# Patient Record
Sex: FEMALE | Race: WHITE | NOT HISPANIC OR LATINO | URBAN - METROPOLITAN AREA
[De-identification: names, ages, dates, MRNs, and addresses within clinical notes are randomized per-mention and may not be internally consistent; named-entity substitution may affect disease eponyms.]

---

## 2022-07-15 ENCOUNTER — WEB ENCOUNTER (OUTPATIENT)
Dept: URBAN - METROPOLITAN AREA CLINIC 105 | Facility: CLINIC | Age: 27
End: 2022-07-15

## 2022-07-15 ENCOUNTER — OFFICE VISIT (OUTPATIENT)
Dept: URBAN - METROPOLITAN AREA CLINIC 105 | Facility: CLINIC | Age: 27
End: 2022-07-15
Payer: COMMERCIAL

## 2022-07-15 ENCOUNTER — LAB OUTSIDE AN ENCOUNTER (OUTPATIENT)
Dept: URBAN - METROPOLITAN AREA CLINIC 105 | Facility: CLINIC | Age: 27
End: 2022-07-15

## 2022-07-15 VITALS
TEMPERATURE: 97.9 F | BODY MASS INDEX: 21.62 KG/M2 | DIASTOLIC BLOOD PRESSURE: 77 MMHG | HEART RATE: 99 BPM | SYSTOLIC BLOOD PRESSURE: 119 MMHG | HEIGHT: 69 IN | WEIGHT: 146 LBS

## 2022-07-15 DIAGNOSIS — R93.5 ABNORMAL CT OF THE ABDOMEN: ICD-10-CM

## 2022-07-15 DIAGNOSIS — Z80.0 FAMILY HISTORY OF COLON CANCER IN MOTHER: ICD-10-CM

## 2022-07-15 DIAGNOSIS — R10.32 LLQ PAIN: ICD-10-CM

## 2022-07-15 DIAGNOSIS — R14.0 BLOATING: ICD-10-CM

## 2022-07-15 PROCEDURE — 99243 OFF/OP CNSLTJ NEW/EST LOW 30: CPT | Performed by: INTERNAL MEDICINE

## 2022-07-15 PROCEDURE — 99203 OFFICE O/P NEW LOW 30 MIN: CPT | Performed by: INTERNAL MEDICINE

## 2022-07-15 RX ORDER — CETIRIZINE HCL 1 MG/ML
1 TABLET SOLUTION, ORAL ORAL ONCE A DAY
Qty: 30 | Status: ACTIVE | COMMUNITY
Start: 2022-07-15 | End: 2022-08-14

## 2022-07-15 RX ORDER — NORETHINDRONE ACETATE AND ETHINYL ESTRADIOL AND FERROUS FUMARATE 1MG-20(21)
TAKE 1 TABLET BY MOUTH EVERY DAY FOR 84 DAYS KIT ORAL
Qty: 84 EACH | Refills: 0 | Status: ACTIVE | COMMUNITY

## 2022-07-15 RX ORDER — DICYCLOMINE HYDROCHLORIDE 20 MG/1
1 TABLET TABLET ORAL
Qty: 90 | Refills: 0 | OUTPATIENT
Start: 2022-07-15 | End: 2022-08-14

## 2022-07-15 NOTE — HPI-TODAY'S VISIT:
7/15/22  26 yo lady pt of Dr Diana Brown. Referred for abdominal pain. A copy of this note will be sent to her.  She was seeing Dr Brown for urological evaluation of abdominal pain. She ordered CT Abd pelvis wo and w contrast done 3/2022 this showed questionable TI thickening which could be  underdistension Her mother passed away from colon CA at age 45.  She has seen GI in the past. Her last colonoscopy (2nd) was  7/18/2020 in [place]. it was normal.  Her GI evaluation for abd pain also included an EGD and was noted no GI causes for abd pain.  Urology evaluation was negative.  She has seen GYN and told it is possible she has endometriosis. Pain is mostly LLQ. really tight feeling. usually in am on awakening. She also has upper abodminal bloating. this is usually post prandial. LLQ pain is a few times a week. She has regular BMs. daily. Does not feel constipated.  Uses nsaids during her periods.

## 2022-08-18 ENCOUNTER — OFFICE VISIT (OUTPATIENT)
Dept: URBAN - METROPOLITAN AREA CLINIC 105 | Facility: CLINIC | Age: 27
End: 2022-08-18

## 2022-09-16 ENCOUNTER — OFFICE VISIT (OUTPATIENT)
Dept: URBAN - METROPOLITAN AREA CLINIC 105 | Facility: CLINIC | Age: 27
End: 2022-09-16

## 2022-10-13 ENCOUNTER — OFFICE VISIT (OUTPATIENT)
Dept: URBAN - METROPOLITAN AREA CLINIC 105 | Facility: CLINIC | Age: 27
End: 2022-10-13
Payer: COMMERCIAL

## 2022-10-13 VITALS
HEIGHT: 69 IN | BODY MASS INDEX: 22.07 KG/M2 | HEART RATE: 76 BPM | WEIGHT: 149 LBS | DIASTOLIC BLOOD PRESSURE: 74 MMHG | SYSTOLIC BLOOD PRESSURE: 113 MMHG | TEMPERATURE: 97.3 F

## 2022-10-13 DIAGNOSIS — R93.5 ABNORMAL CT OF THE ABDOMEN: ICD-10-CM

## 2022-10-13 DIAGNOSIS — R10.32 LLQ PAIN: ICD-10-CM

## 2022-10-13 DIAGNOSIS — K58.9 SYMPTOMS CONSISTENT WITH IRRITABLE BOWEL SYNDROME: ICD-10-CM

## 2022-10-13 DIAGNOSIS — Z80.0 FAMILY HISTORY OF COLON CANCER IN MOTHER: ICD-10-CM

## 2022-10-13 DIAGNOSIS — Z86.010 PERSONAL HISTORY OF COLONIC POLYPS: ICD-10-CM

## 2022-10-13 DIAGNOSIS — R14.0 BLOATING: ICD-10-CM

## 2022-10-13 PROBLEM — 10743008: Status: ACTIVE | Noted: 2022-10-13

## 2022-10-13 PROBLEM — 428283002: Status: ACTIVE | Noted: 2022-10-13

## 2022-10-13 PROCEDURE — 99213 OFFICE O/P EST LOW 20 MIN: CPT | Performed by: INTERNAL MEDICINE

## 2022-10-13 RX ORDER — NORETHINDRONE ACETATE AND ETHINYL ESTRADIOL AND FERROUS FUMARATE 1MG-20(21)
TAKE 1 TABLET BY MOUTH EVERY DAY FOR 84 DAYS KIT ORAL
Qty: 84 EACH | Refills: 0 | Status: ACTIVE | COMMUNITY

## 2022-10-13 NOTE — HPI-TODAY'S VISIT:
7/15/22  26 yo lady pt of Dr Diana Brown. Referred for abdominal pain. A copy of this note will be sent to her.  She was seeing Dr Brown for urological evaluation of abdominal pain. She ordered CT Abd pelvis wo and w contrast done 3/2022 this showed questionable TI thickening which could be  underdistension Her mother passed away from colon CA at age 45.  She has seen GI in the past. Her last colonoscopy (2nd) was  7/18/2020 in [place]. it was normal.  Her GI evaluation for abd pain also included an EGD and was noted no GI causes for abd pain.  Urology evaluation was negative.  She has seen GYN and told it is possible she has endometriosis. Pain is mostly LLQ. really tight feeling. usually in am on awakening. She also has upper abodminal bloating. this is usually post prandial. LLQ pain is a few times a week. She has regular BMs. daily. Does not feel constipated.  Uses nsaids during her periods.  10/13/22 Here for f/u visit Reviewed colonoscopy from 2020 in detail. [facility] 7/2022  significant constipation Took miralax for weeks with improved BMs. NOted drinking more water and cutting back on sugary coffee has been v. helpful Rarely felt LLQ discomfort. None with regular BMs "So far its been good" Took bentyl rarely.

## 2023-09-25 ENCOUNTER — TELEPHONE ENCOUNTER (OUTPATIENT)
Dept: URBAN - METROPOLITAN AREA CLINIC 96 | Facility: CLINIC | Age: 28
End: 2023-09-25

## 2023-09-26 ENCOUNTER — DASHBOARD ENCOUNTERS (OUTPATIENT)
Age: 28
End: 2023-09-26

## 2023-09-26 ENCOUNTER — OFFICE VISIT (OUTPATIENT)
Dept: URBAN - METROPOLITAN AREA CLINIC 96 | Facility: CLINIC | Age: 28
End: 2023-09-26
Payer: COMMERCIAL

## 2023-09-26 VITALS
HEART RATE: 88 BPM | HEIGHT: 69 IN | TEMPERATURE: 97.9 F | WEIGHT: 148 LBS | DIASTOLIC BLOOD PRESSURE: 62 MMHG | SYSTOLIC BLOOD PRESSURE: 104 MMHG | BODY MASS INDEX: 21.92 KG/M2

## 2023-09-26 DIAGNOSIS — R10.11 RUQ PAIN: ICD-10-CM

## 2023-09-26 DIAGNOSIS — Z80.0 FAMILY HISTORY OF COLON CANCER: ICD-10-CM

## 2023-09-26 DIAGNOSIS — K58.8 OTHER IRRITABLE BOWEL SYNDROME: ICD-10-CM

## 2023-09-26 PROBLEM — 10743008: Status: ACTIVE | Noted: 2023-09-26

## 2023-09-26 PROCEDURE — 99214 OFFICE O/P EST MOD 30 MIN: CPT | Performed by: INTERNAL MEDICINE

## 2023-09-26 RX ORDER — NORETHINDRONE ACETATE AND ETHINYL ESTRADIOL AND FERROUS FUMARATE 1MG-20(21)
TAKE 1 TABLET BY MOUTH EVERY DAY FOR 84 DAYS KIT ORAL
Qty: 84 EACH | Refills: 0 | Status: ACTIVE | COMMUNITY

## 2023-09-26 RX ORDER — DICYCLOMINE HYDROCHLORIDE 10 MG/1
1 CAPSULE ORAL
Qty: 30 | Refills: 5 | OUTPATIENT
Start: 2023-09-26 | End: 2024-03-24

## 2023-09-26 NOTE — HPI-TODAY'S VISIT:
28-year-old female recently seen by Dr. Morris 10/13/2022.  As noted by Dr. Morris, prior CT abdomen pelvis with and without contrast 3/2022 with questionable TI thickening possibly due to under distention.  Mother  of colon cancer age 45.  Prior colonoscopy, second exam, performed 2020 in [place] and normal per patient.  Patient also reported having had prior EGD which was unremarkable as well.  Has been seen by gynecology and told she may have endometriosis.  At prior visit, Dr. Morris prescribed trial of dicyclomine.  [facility] imaging 7/15/2022 with moderate stool burden. Consideration for possible repeat colonoscopy pending her clinical progress.  She now presents for follow up with me due to closer office location   She reports 2023 had RUQ pain, saw primary MD, RUQ ultrasound was normal per patient. Normal CMP, normal lipase, normal CBC but elevated CRP.  (Patient shows me correspondence in her phone) Was told was infection given elevated WBC and empirically treated with antibiotics and pain improved. No f/c. No melena, no rectal bleeding. No unintentional weight loss. No n/v. No jaundice, no icterus.  Another episode one week ago, worse with fatty foods. Improved with monitoring diet.  Normal formed stools. No rectal bleeding, no diarrhea.

## 2024-07-12 ENCOUNTER — WEB ENCOUNTER (OUTPATIENT)
Dept: URBAN - METROPOLITAN AREA CLINIC 95 | Facility: CLINIC | Age: 29
End: 2024-07-12

## 2024-07-30 ENCOUNTER — LAB OUTSIDE AN ENCOUNTER (OUTPATIENT)
Dept: URBAN - METROPOLITAN AREA CLINIC 96 | Facility: CLINIC | Age: 29
End: 2024-07-30

## 2024-07-30 ENCOUNTER — OFFICE VISIT (OUTPATIENT)
Dept: URBAN - METROPOLITAN AREA CLINIC 96 | Facility: CLINIC | Age: 29
End: 2024-07-30
Payer: COMMERCIAL

## 2024-07-30 VITALS
DIASTOLIC BLOOD PRESSURE: 69 MMHG | BODY MASS INDEX: 21.92 KG/M2 | HEIGHT: 69 IN | TEMPERATURE: 97.9 F | HEART RATE: 88 BPM | WEIGHT: 148 LBS | SYSTOLIC BLOOD PRESSURE: 120 MMHG

## 2024-07-30 DIAGNOSIS — Z80.0 FAMILY HISTORY OF COLON CANCER: ICD-10-CM

## 2024-07-30 DIAGNOSIS — K58.9 IRRITABLE BOWEL SYNDROME, UNSPECIFIED TYPE: ICD-10-CM

## 2024-07-30 DIAGNOSIS — R14.0 BLOATING: ICD-10-CM

## 2024-07-30 DIAGNOSIS — R10.11 RUQ PAIN: ICD-10-CM

## 2024-07-30 PROCEDURE — 99214 OFFICE O/P EST MOD 30 MIN: CPT | Performed by: INTERNAL MEDICINE

## 2024-07-30 RX ORDER — NORETHINDRONE ACETATE AND ETHINYL ESTRADIOL AND FERROUS FUMARATE 1MG-20(21)
TAKE 1 TABLET BY MOUTH EVERY DAY FOR 84 DAYS KIT ORAL
Qty: 84 EACH | Refills: 0 | Status: ACTIVE | COMMUNITY

## 2024-07-30 RX ORDER — FAMOTIDINE 40 MG/1
1 TABLET AT BEDTIME TABLET, FILM COATED ORAL ONCE A DAY
Status: ACTIVE | COMMUNITY

## 2024-07-30 NOTE — PHYSICAL EXAM GASTROINTESTINAL
Abdomen , soft, minimally tender in the RLQ with NO guarding or rigidity , no masses palpable , normal bowel sounds , Liver and Spleen,  no hepatosplenomegaly , liver nontender

## 2024-07-30 NOTE — HPI-TODAY'S VISIT:
29-year-old female  seen 2023, previously seen by Dr. Morris 10/13/2022.  As noted by Dr. Morris, prior CT abdomen pelvis with and without contrast 3/2022 with questionable TI thickening possibly due to under distention.  Mother  of colon cancer age 45.  Prior colonoscopy, second exam, performed 2020 in [place] and normal per patient.  Patient also reported having had prior EGD which was unremarkable as well.  Has been seen by gynecology and told she may have endometriosis.  At prior visit, Dr. Morris prescribed trial of dicyclomine.  [facility] imaging 7/15/2022 with moderate stool burden. Consideration for possible repeat colonoscopy pending her clinical progress.  She now presents for follow up with me due to closer office location   She reports 2023 had RUQ pain, saw primary MD, RUQ ultrasound was normal per patient. Normal CMP, normal lipase, normal CBC but elevated CRP.  (Patient shows me correspondence in her phone) Was told was infection given elevated WBC and empirically treated with antibiotics and pain improved. No f/c. No melena, no rectal bleeding. No unintentional weight loss. No n/v. No jaundice, no icterus.  At prior visit, was doing well and rx for TID dicyclomine was provided given prior response per patient. Discussed if any recurrent sx, patient to call and HIDA would then be scheduled. Colonoscopy due , prior exam normal per patient in  in [place].  She reports laparoscopy done 2024 confirmed endometriosis, ablated and noted improvement in pain until recently.   Patient reports some recurrent RUQ pain 2 weeks ago, similar to prior. Onset with walking, worse with movement. No n/v, no dysphagia, better with eating. No dysphagia, no odynophagia no jaundice or icterus. Onset after working out. No trial of NSAIDs. Tylenol did improve her complaint. No unintentional weight loss. Normal formed stools. No diarrhea. Perhaps worse with menstrual cycle.

## 2024-07-31 ENCOUNTER — OFFICE VISIT (OUTPATIENT)
Dept: URBAN - METROPOLITAN AREA CLINIC 96 | Facility: CLINIC | Age: 29
End: 2024-07-31

## 2024-08-05 ENCOUNTER — OFFICE VISIT (OUTPATIENT)
Dept: URBAN - METROPOLITAN AREA CLINIC 96 | Facility: CLINIC | Age: 29
End: 2024-08-05

## 2024-09-18 ENCOUNTER — OFFICE VISIT (OUTPATIENT)
Dept: URBAN - METROPOLITAN AREA CLINIC 96 | Facility: CLINIC | Age: 29
End: 2024-09-18
Payer: COMMERCIAL

## 2024-09-18 VITALS
HEART RATE: 82 BPM | WEIGHT: 148.6 LBS | DIASTOLIC BLOOD PRESSURE: 70 MMHG | HEIGHT: 69 IN | SYSTOLIC BLOOD PRESSURE: 109 MMHG | BODY MASS INDEX: 22.01 KG/M2

## 2024-09-18 DIAGNOSIS — Z80.0 FAMILY HISTORY OF COLON CANCER: ICD-10-CM

## 2024-09-18 DIAGNOSIS — K58.9 IRRITABLE BOWEL SYNDROME, UNSPECIFIED TYPE: ICD-10-CM

## 2024-09-18 DIAGNOSIS — R17 ELEVATED BILIRUBIN: ICD-10-CM

## 2024-09-18 DIAGNOSIS — R14.0 BLOATING: ICD-10-CM

## 2024-09-18 DIAGNOSIS — R10.11 RUQ PAIN: ICD-10-CM

## 2024-09-18 PROCEDURE — 99213 OFFICE O/P EST LOW 20 MIN: CPT

## 2024-09-18 RX ORDER — NORETHINDRONE ACETATE AND ETHINYL ESTRADIOL AND FERROUS FUMARATE 1MG-20(21)
TAKE 1 TABLET BY MOUTH EVERY DAY FOR 84 DAYS KIT ORAL
Qty: 84 EACH | Refills: 0 | Status: ACTIVE | COMMUNITY

## 2024-09-18 RX ORDER — FAMOTIDINE 40 MG/1
1 TABLET AT BEDTIME TABLET, FILM COATED ORAL ONCE A DAY
Status: ACTIVE | COMMUNITY

## 2024-09-18 RX ORDER — DICYCLOMINE HYDROCHLORIDE 10 MG/1
1 CAPSULE ORAL THREE TIMES A DAY
Qty: 90 | Refills: 1
Start: 2023-09-26 | End: 2024-11-16

## 2024-09-18 NOTE — HPI-OTHER HISTORIES
Previously 2024 with Dr. Clifton: 29-year-old female  seen 2023, previously seen by Dr. Morris 10/13/2022. As noted by Dr. Morris, prior CT abdomen pelvis with and without contrast 3/2022 with questionable TI thickening possibly due to under distention. Mother  of colon cancer age 45. Prior colonoscopy, second exam, performed 2020 in [place] and normal per patient. Patient also reported having had prior EGD which was unremarkable as well. Has been seen by gynecology and told she may have endometriosis. At prior visit, Dr. Morris prescribed trial of dicyclomine. [facility] imaging 7/15/2022 with moderate stool burden. Consideration for possible repeat colonoscopy pending her clinical progress.  She now presents for follow up with me due to closer office location  She reports 2023 had RUQ pain, saw primary MD, RUQ ultrasound was normal per patient. Normal CMP, normal lipase, normal CBC but elevated CRP. (Patient shows me correspondence in her phone) Was told was infection given elevated WBC and empirically treated with antibiotics and pain improved. No f/c. No melena, no rectal bleeding. No unintentional weight loss. No n/v. No jaundice, no icterus.  At prior visit, was doing well and rx for TID dicyclomine was provided given prior response per patient. Discussed if any recurrent sx, patient to call and HIDA would then be scheduled. Colonoscopy due , prior exam normal per patient in  in [place].  She reports laparoscopy done 2024 confirmed endometriosis, ablated and noted improvement in pain until recently.   Patient reports some recurrent RUQ pain 2 weeks ago, similar to prior. Onset with walking, worse with movement. No n/v, no dysphagia, better with eating. No dysphagia, no odynophagia no jaundice or icterus. Onset after working out. No trial of NSAIDs. Tylenol did improve her complaint. No unintentional weight loss. Normal formed stools. No diarrhea. Perhaps worse with menstrual cycle.

## 2024-09-18 NOTE — HPI-TODAY'S VISIT:
29 year old female presents for follow-up. Continues to have intermittent RUQ pain - not associated with PO intake. Has not tried dicyclomine - didn't think it would help with GB pain. Recent labs from PCP revealed elevated Tbili, everything else unremarkable.  Pt unable to pull up labs on her phone during office visit. . HIDA scan, 9/3/2024: Normal gallbladder filling and emptying.  Ejection fraction 76%. . CT abdomen and pelvis with contrast, 8/14/2024: No suspicious liver or biliary ductal dilation.  Gallbladder, spleen, pancreas, adrenals are unremarkable.  No bowel obstruction, pneumoperitoneum, or ascites.  Normal appendix.  Prominence of parametrial vasculature which can be seen in the setting of pelvic congestion syndrome.

## 2024-09-19 PROBLEM — 312824007: Status: ACTIVE | Noted: 2024-09-19

## 2024-09-19 PROBLEM — 116289008: Status: ACTIVE | Noted: 2024-09-19

## 2024-09-19 PROBLEM — 26165005: Status: ACTIVE | Noted: 2024-09-19

## 2024-09-19 PROBLEM — 301717006: Status: ACTIVE | Noted: 2024-09-19

## 2024-09-21 LAB
ALBUMIN/GLOBULIN RATIO: 1.6
ALBUMIN: 4.2
ALKALINE PHOSPHATASE: 45
ALT (SGPT): 13
AST (SGOT): 17
BILIRUBIN, DIRECT: 0.3
BILIRUBIN, INDIRECT: 1
BILIRUBIN, TOTAL: 1.3
GLOBULIN: 2.7
PROTEIN, TOTAL: 6.9

## 2024-09-23 ENCOUNTER — TELEPHONE ENCOUNTER (OUTPATIENT)
Dept: URBAN - METROPOLITAN AREA CLINIC 96 | Facility: CLINIC | Age: 29
End: 2024-09-23

## 2024-10-10 ENCOUNTER — ERX REFILL RESPONSE (OUTPATIENT)
Dept: URBAN - METROPOLITAN AREA CLINIC 96 | Facility: CLINIC | Age: 29
End: 2024-10-10

## 2024-10-10 RX ORDER — DICYCLOMINE HYDROCHLORIDE 10 MG/1
TAKE 1 CAPSULE BY MOUTH THREE TIMES A DAY CAPSULE ORAL
Qty: 270 CAPSULE | Refills: 1 | OUTPATIENT

## 2024-10-10 RX ORDER — DICYCLOMINE HYDROCHLORIDE 10 MG/1
1 CAPSULE ORAL THREE TIMES A DAY
Qty: 90 | Refills: 1 | OUTPATIENT